# Patient Record
Sex: FEMALE | Race: WHITE | NOT HISPANIC OR LATINO | URBAN - METROPOLITAN AREA
[De-identification: names, ages, dates, MRNs, and addresses within clinical notes are randomized per-mention and may not be internally consistent; named-entity substitution may affect disease eponyms.]

---

## 2025-02-03 ENCOUNTER — TELEPHONE (OUTPATIENT)
Age: 55
End: 2025-02-03

## 2025-02-03 NOTE — TELEPHONE ENCOUNTER
SW called regarding HDC for patient.  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    Provider gave the email address and work queue ID, to send the referral over to Intake by the CM.